# Patient Record
Sex: FEMALE | Race: WHITE
[De-identification: names, ages, dates, MRNs, and addresses within clinical notes are randomized per-mention and may not be internally consistent; named-entity substitution may affect disease eponyms.]

---

## 2020-07-21 ENCOUNTER — HOSPITAL ENCOUNTER (OUTPATIENT)
Dept: HOSPITAL 84 - D.ER | Age: 80
Setting detail: OBSERVATION
LOS: 1 days | Discharge: HOME | End: 2020-07-22
Attending: FAMILY MEDICINE | Admitting: FAMILY MEDICINE
Payer: MEDICARE

## 2020-07-21 VITALS — DIASTOLIC BLOOD PRESSURE: 55 MMHG | SYSTOLIC BLOOD PRESSURE: 94 MMHG

## 2020-07-21 VITALS
HEIGHT: 61 IN | WEIGHT: 190.4 LBS | HEIGHT: 61 IN | BODY MASS INDEX: 35.95 KG/M2 | WEIGHT: 190.4 LBS | BODY MASS INDEX: 35.95 KG/M2

## 2020-07-21 VITALS — SYSTOLIC BLOOD PRESSURE: 105 MMHG | DIASTOLIC BLOOD PRESSURE: 51 MMHG

## 2020-07-21 VITALS — SYSTOLIC BLOOD PRESSURE: 93 MMHG | DIASTOLIC BLOOD PRESSURE: 59 MMHG

## 2020-07-21 VITALS — DIASTOLIC BLOOD PRESSURE: 49 MMHG | SYSTOLIC BLOOD PRESSURE: 91 MMHG

## 2020-07-21 VITALS — DIASTOLIC BLOOD PRESSURE: 50 MMHG | SYSTOLIC BLOOD PRESSURE: 98 MMHG

## 2020-07-21 DIAGNOSIS — I25.10: ICD-10-CM

## 2020-07-21 DIAGNOSIS — E03.9: ICD-10-CM

## 2020-07-21 DIAGNOSIS — F32.9: ICD-10-CM

## 2020-07-21 DIAGNOSIS — J81.1: ICD-10-CM

## 2020-07-21 DIAGNOSIS — I21.4: Primary | ICD-10-CM

## 2020-07-21 DIAGNOSIS — J44.9: ICD-10-CM

## 2020-07-21 DIAGNOSIS — R06.02: ICD-10-CM

## 2020-07-21 LAB
ALBUMIN SERPL-MCNC: 3.8 G/DL (ref 3.4–5)
ALP SERPL-CCNC: 59 U/L (ref 30–120)
ALT SERPL-CCNC: 21 U/L (ref 10–68)
ANION GAP SERPL CALC-SCNC: 12 MMOL/L (ref 8–16)
BASOPHILS NFR BLD AUTO: 0.2 % (ref 0–2)
BILIRUB SERPL-MCNC: 0.28 MG/DL (ref 0.2–1.3)
BUN SERPL-MCNC: 15 MG/DL (ref 7–18)
CALCIUM SERPL-MCNC: 8.4 MG/DL (ref 8.5–10.1)
CHLORIDE SERPL-SCNC: 105 MMOL/L (ref 98–107)
CK MB SERPL-MCNC: 10.1 U/L (ref 0–3.6)
CK SERPL-CCNC: 290 UL (ref 21–215)
CO2 SERPL-SCNC: 28.3 MMOL/L (ref 21–32)
CREAT SERPL-MCNC: 1.2 MG/DL (ref 0.6–1.3)
D DIMER PPP FEU-MCNC: 0.89 UG/MLFEU (ref 0.2–0.54)
EOSINOPHIL NFR BLD: 0.6 % (ref 0–7)
ERYTHROCYTE [DISTWIDTH] IN BLOOD BY AUTOMATED COUNT: 14.2 % (ref 11.5–14.5)
GLOBULIN SER-MCNC: 3.1 G/L
GLUCOSE SERPL-MCNC: 105 MG/DL (ref 74–106)
HCT VFR BLD CALC: 40.2 % (ref 36–48)
HGB BLD-MCNC: 12.6 G/DL (ref 12–16)
IMM GRANULOCYTES NFR BLD: 0.2 % (ref 0–5)
INR PPP: 1.03 (ref 0.85–1.17)
LYMPHOCYTES NFR BLD AUTO: 14.3 % (ref 15–50)
MCH RBC QN AUTO: 28.3 PG (ref 26–34)
MCHC RBC AUTO-ENTMCNC: 31.3 G/DL (ref 31–37)
MCV RBC: 90.1 FL (ref 80–100)
MONOCYTES NFR BLD: 4.1 % (ref 2–11)
NEUTROPHILS NFR BLD AUTO: 80.6 % (ref 40–80)
OSMOLALITY SERPL CALC.SUM OF ELEC: 281 MOSM/KG (ref 275–300)
PLATELET # BLD: 197 10X3/UL (ref 130–400)
PMV BLD AUTO: 11.2 FL (ref 7.4–10.4)
POTASSIUM SERPL-SCNC: 4.3 MMOL/L (ref 3.5–5.1)
PROT SERPL-MCNC: 6.9 G/DL (ref 6.4–8.2)
PROTHROMBIN TIME: 13.4 SECONDS (ref 11.6–15)
RBC # BLD AUTO: 4.46 10X6/UL (ref 4–5.4)
SODIUM SERPL-SCNC: 141 MMOL/L (ref 136–145)
TROPONIN I SERPL-MCNC: 2.83 NG/ML (ref 0–0.06)
WBC # BLD AUTO: 10.3 10X3/UL (ref 4.8–10.8)

## 2020-07-22 VITALS — SYSTOLIC BLOOD PRESSURE: 92 MMHG | DIASTOLIC BLOOD PRESSURE: 56 MMHG

## 2020-07-22 VITALS — DIASTOLIC BLOOD PRESSURE: 43 MMHG | SYSTOLIC BLOOD PRESSURE: 84 MMHG

## 2020-07-22 VITALS — DIASTOLIC BLOOD PRESSURE: 39 MMHG | SYSTOLIC BLOOD PRESSURE: 83 MMHG

## 2020-07-22 LAB
ALBUMIN SERPL-MCNC: 3.3 G/DL (ref 3.4–5)
ALP SERPL-CCNC: 50 U/L (ref 30–120)
ALT SERPL-CCNC: 19 U/L (ref 10–68)
ANION GAP SERPL CALC-SCNC: 12 MMOL/L (ref 8–16)
BASOPHILS NFR BLD AUTO: 0.2 % (ref 0–2)
BILIRUB SERPL-MCNC: 0.35 MG/DL (ref 0.2–1.3)
BUN SERPL-MCNC: 14 MG/DL (ref 7–18)
CALCIUM SERPL-MCNC: 8 MG/DL (ref 8.5–10.1)
CHLORIDE SERPL-SCNC: 106 MMOL/L (ref 98–107)
CK MB SERPL-MCNC: 9 U/L (ref 0–3.6)
CK SERPL-CCNC: 352 UL (ref 21–215)
CO2 SERPL-SCNC: 28.6 MMOL/L (ref 21–32)
CREAT SERPL-MCNC: 0.9 MG/DL (ref 0.6–1.3)
EOSINOPHIL NFR BLD: 2 % (ref 0–7)
ERYTHROCYTE [DISTWIDTH] IN BLOOD BY AUTOMATED COUNT: 14.5 % (ref 11.5–14.5)
GLOBULIN SER-MCNC: 2.7 G/L
GLUCOSE SERPL-MCNC: 91 MG/DL (ref 74–106)
HCT VFR BLD CALC: 36.9 % (ref 36–48)
HGB BLD-MCNC: 11.4 G/DL (ref 12–16)
IMM GRANULOCYTES NFR BLD: 0 % (ref 0–5)
LYMPHOCYTES NFR BLD AUTO: 21.8 % (ref 15–50)
MCH RBC QN AUTO: 28.1 PG (ref 26–34)
MCHC RBC AUTO-ENTMCNC: 30.9 G/DL (ref 31–37)
MCV RBC: 90.9 FL (ref 80–100)
MONOCYTES NFR BLD: 7.1 % (ref 2–11)
NEUTROPHILS NFR BLD AUTO: 68.9 % (ref 40–80)
OSMOLALITY SERPL CALC.SUM OF ELEC: 283 MOSM/KG (ref 275–300)
PLATELET # BLD: 176 10X3/UL (ref 130–400)
PMV BLD AUTO: 11.1 FL (ref 7.4–10.4)
POTASSIUM SERPL-SCNC: 4.6 MMOL/L (ref 3.5–5.1)
PROT SERPL-MCNC: 6 G/DL (ref 6.4–8.2)
RBC # BLD AUTO: 4.06 10X6/UL (ref 4–5.4)
SODIUM SERPL-SCNC: 142 MMOL/L (ref 136–145)
TROPONIN I SERPL-MCNC: 2.06 NG/ML (ref 0–0.06)
WBC # BLD AUTO: 5.5 10X3/UL (ref 4.8–10.8)

## 2020-07-22 NOTE — NUR
LEFT FA 22G IV DC'D WITH CATH INTACT. DISCHARGE INSTRUCTIONS AND TEACHING DONE
WITH PT AND PT'S DAUGHTER WHO IS AT BEDSIDE. ALL QUESTIONS ANSWERED AND THEY
HAVE NO FURTHER QUESTIONS. PT STATES SHE WANTS NANCYER TO SIGN HER CHART COPY
PAPERWORK. DAUGHTER SIGNED DISCAHRGE CHART COPY. PT'S DAUGHTER STATES THEIR
RIDE IS ON THE WAY TO PICK THEM UP. I VERBALIZED UNDERSTANDING.

## 2020-07-22 NOTE — NUR
PT'S DAUGHTER IN [facility] DEMANDING FOR RIGHT AC IV TO BE TAKEN OUT OF PT.
BRYSON;FELIX RN DC'D RIGHT AC IV AND INSERTED A LEFT FA 22G IV.

## 2020-07-27 ENCOUNTER — HOSPITAL ENCOUNTER (INPATIENT)
Dept: HOSPITAL 84 - D.ER | Age: 80
LOS: 3 days | Discharge: HOME | DRG: 280 | End: 2020-07-30
Attending: FAMILY MEDICINE | Admitting: FAMILY MEDICINE
Payer: MEDICARE

## 2020-07-27 VITALS — DIASTOLIC BLOOD PRESSURE: 42 MMHG | SYSTOLIC BLOOD PRESSURE: 113 MMHG

## 2020-07-27 VITALS — DIASTOLIC BLOOD PRESSURE: 61 MMHG | SYSTOLIC BLOOD PRESSURE: 111 MMHG

## 2020-07-27 VITALS — DIASTOLIC BLOOD PRESSURE: 54 MMHG | SYSTOLIC BLOOD PRESSURE: 118 MMHG

## 2020-07-27 VITALS — BODY MASS INDEX: 37 KG/M2 | BODY MASS INDEX: 37 KG/M2 | BODY MASS INDEX: 37 KG/M2 | WEIGHT: 196 LBS | HEIGHT: 61 IN

## 2020-07-27 VITALS — SYSTOLIC BLOOD PRESSURE: 108 MMHG | DIASTOLIC BLOOD PRESSURE: 52 MMHG

## 2020-07-27 VITALS — SYSTOLIC BLOOD PRESSURE: 118 MMHG | DIASTOLIC BLOOD PRESSURE: 65 MMHG

## 2020-07-27 VITALS — DIASTOLIC BLOOD PRESSURE: 53 MMHG | SYSTOLIC BLOOD PRESSURE: 113 MMHG

## 2020-07-27 VITALS — SYSTOLIC BLOOD PRESSURE: 114 MMHG | DIASTOLIC BLOOD PRESSURE: 53 MMHG

## 2020-07-27 VITALS — SYSTOLIC BLOOD PRESSURE: 120 MMHG | DIASTOLIC BLOOD PRESSURE: 72 MMHG

## 2020-07-27 VITALS — DIASTOLIC BLOOD PRESSURE: 70 MMHG | SYSTOLIC BLOOD PRESSURE: 121 MMHG

## 2020-07-27 VITALS — SYSTOLIC BLOOD PRESSURE: 86 MMHG | DIASTOLIC BLOOD PRESSURE: 40 MMHG

## 2020-07-27 VITALS — DIASTOLIC BLOOD PRESSURE: 53 MMHG | SYSTOLIC BLOOD PRESSURE: 121 MMHG

## 2020-07-27 VITALS — DIASTOLIC BLOOD PRESSURE: 50 MMHG | SYSTOLIC BLOOD PRESSURE: 117 MMHG

## 2020-07-27 VITALS — DIASTOLIC BLOOD PRESSURE: 66 MMHG | SYSTOLIC BLOOD PRESSURE: 120 MMHG

## 2020-07-27 VITALS — SYSTOLIC BLOOD PRESSURE: 115 MMHG | DIASTOLIC BLOOD PRESSURE: 43 MMHG

## 2020-07-27 VITALS — SYSTOLIC BLOOD PRESSURE: 81 MMHG | DIASTOLIC BLOOD PRESSURE: 35 MMHG

## 2020-07-27 VITALS — DIASTOLIC BLOOD PRESSURE: 50 MMHG | SYSTOLIC BLOOD PRESSURE: 113 MMHG

## 2020-07-27 VITALS — DIASTOLIC BLOOD PRESSURE: 45 MMHG | SYSTOLIC BLOOD PRESSURE: 76 MMHG

## 2020-07-27 VITALS — DIASTOLIC BLOOD PRESSURE: 52 MMHG | SYSTOLIC BLOOD PRESSURE: 109 MMHG

## 2020-07-27 VITALS — DIASTOLIC BLOOD PRESSURE: 66 MMHG | SYSTOLIC BLOOD PRESSURE: 112 MMHG

## 2020-07-27 VITALS — SYSTOLIC BLOOD PRESSURE: 122 MMHG | DIASTOLIC BLOOD PRESSURE: 62 MMHG

## 2020-07-27 VITALS — SYSTOLIC BLOOD PRESSURE: 113 MMHG | DIASTOLIC BLOOD PRESSURE: 56 MMHG

## 2020-07-27 VITALS — SYSTOLIC BLOOD PRESSURE: 118 MMHG | DIASTOLIC BLOOD PRESSURE: 61 MMHG

## 2020-07-27 VITALS — SYSTOLIC BLOOD PRESSURE: 114 MMHG | DIASTOLIC BLOOD PRESSURE: 58 MMHG

## 2020-07-27 VITALS — DIASTOLIC BLOOD PRESSURE: 59 MMHG | SYSTOLIC BLOOD PRESSURE: 112 MMHG

## 2020-07-27 VITALS — SYSTOLIC BLOOD PRESSURE: 112 MMHG | DIASTOLIC BLOOD PRESSURE: 62 MMHG

## 2020-07-27 VITALS — SYSTOLIC BLOOD PRESSURE: 117 MMHG | DIASTOLIC BLOOD PRESSURE: 74 MMHG

## 2020-07-27 VITALS — SYSTOLIC BLOOD PRESSURE: 106 MMHG | DIASTOLIC BLOOD PRESSURE: 49 MMHG

## 2020-07-27 VITALS — DIASTOLIC BLOOD PRESSURE: 38 MMHG | SYSTOLIC BLOOD PRESSURE: 83 MMHG

## 2020-07-27 VITALS — DIASTOLIC BLOOD PRESSURE: 62 MMHG | SYSTOLIC BLOOD PRESSURE: 122 MMHG

## 2020-07-27 VITALS — SYSTOLIC BLOOD PRESSURE: 116 MMHG | DIASTOLIC BLOOD PRESSURE: 53 MMHG

## 2020-07-27 VITALS — SYSTOLIC BLOOD PRESSURE: 128 MMHG | DIASTOLIC BLOOD PRESSURE: 73 MMHG

## 2020-07-27 VITALS — SYSTOLIC BLOOD PRESSURE: 119 MMHG | DIASTOLIC BLOOD PRESSURE: 65 MMHG

## 2020-07-27 DIAGNOSIS — I42.8: ICD-10-CM

## 2020-07-27 DIAGNOSIS — I10: ICD-10-CM

## 2020-07-27 DIAGNOSIS — J96.01: ICD-10-CM

## 2020-07-27 DIAGNOSIS — I21.A1: ICD-10-CM

## 2020-07-27 DIAGNOSIS — G20: ICD-10-CM

## 2020-07-27 DIAGNOSIS — I50.9: ICD-10-CM

## 2020-07-27 DIAGNOSIS — E03.9: ICD-10-CM

## 2020-07-27 DIAGNOSIS — E78.5: ICD-10-CM

## 2020-07-27 DIAGNOSIS — I27.20: ICD-10-CM

## 2020-07-27 DIAGNOSIS — I11.0: Primary | ICD-10-CM

## 2020-07-27 DIAGNOSIS — I95.9: ICD-10-CM

## 2020-07-27 LAB
ALBUMIN SERPL-MCNC: 3.7 G/DL (ref 3.4–5)
ALP SERPL-CCNC: 56 U/L (ref 30–120)
ALT SERPL-CCNC: 38 U/L (ref 10–68)
ANION GAP SERPL CALC-SCNC: 9.3 MMOL/L (ref 8–16)
APTT BLD: 26.5 SECONDS (ref 22.8–39.4)
BASOPHILS NFR BLD AUTO: 0 % (ref 0–2)
BILIRUB SERPL-MCNC: 0.5 MG/DL (ref 0.2–1.3)
BUN SERPL-MCNC: 25 MG/DL (ref 7–18)
CALCIUM SERPL-MCNC: 8 MG/DL (ref 8.5–10.1)
CHLORIDE SERPL-SCNC: 106 MMOL/L (ref 98–107)
CK MB SERPL-MCNC: 2.9 U/L (ref 0–3.6)
CK MB SERPL-MCNC: 4 U/L (ref 0–3.6)
CK SERPL-CCNC: 164 UL (ref 21–215)
CK SERPL-CCNC: 190 UL (ref 21–215)
CO2 SERPL-SCNC: 30.6 MMOL/L (ref 21–32)
CREAT SERPL-MCNC: 1.2 MG/DL (ref 0.6–1.3)
EOSINOPHIL NFR BLD: 0.2 % (ref 0–7)
ERYTHROCYTE [DISTWIDTH] IN BLOOD BY AUTOMATED COUNT: 14.7 % (ref 11.5–14.5)
GLOBULIN SER-MCNC: 3.3 G/L
GLUCOSE SERPL-MCNC: 130 MG/DL (ref 74–106)
HCT VFR BLD CALC: 36.8 % (ref 36–48)
HGB BLD-MCNC: 11.7 G/DL (ref 12–16)
IMM GRANULOCYTES NFR BLD: 0.2 % (ref 0–5)
INR PPP: 1.01 (ref 0.85–1.17)
LYMPHOCYTES NFR BLD AUTO: 14.8 % (ref 15–50)
MAGNESIUM SERPL-MCNC: 2.3 MG/DL (ref 1.8–2.4)
MCH RBC QN AUTO: 28.3 PG (ref 26–34)
MCHC RBC AUTO-ENTMCNC: 31.8 G/DL (ref 31–37)
MCV RBC: 89.1 FL (ref 80–100)
MONOCYTES NFR BLD: 2.4 % (ref 2–11)
NEUTROPHILS NFR BLD AUTO: 82.4 % (ref 40–80)
OSMOLALITY SERPL CALC.SUM OF ELEC: 288 MOSM/KG (ref 275–300)
PLATELET # BLD: 184 10X3/UL (ref 130–400)
PMV BLD AUTO: 11.1 FL (ref 7.4–10.4)
POTASSIUM SERPL-SCNC: 3.9 MMOL/L (ref 3.5–5.1)
PROT SERPL-MCNC: 7 G/DL (ref 6.4–8.2)
PROTHROMBIN TIME: 13.3 SECONDS (ref 11.6–15)
RBC # BLD AUTO: 4.13 10X6/UL (ref 4–5.4)
SODIUM SERPL-SCNC: 142 MMOL/L (ref 136–145)
TROPONIN I SERPL-MCNC: 0.07 NG/ML (ref 0–0.06)
TROPONIN I SERPL-MCNC: 0.07 NG/ML (ref 0–0.06)
WBC # BLD AUTO: 5.9 10X3/UL (ref 4.8–10.8)

## 2020-07-27 NOTE — NUR
PT LAYING IN BED. NO DISTRESS NOTED. COLOR WNL FOR RACE. LEVOPHED INITIATED AT
THIS TIME. DENIES CHEST PAIN.

## 2020-07-27 NOTE — NUR
REASSESSMENT COMPLETED, SEE FLOWSHEET. PT C/O PAIN IN RIGHT UPPER ABDOMEN AT
THIS TIME. REPOSITIONED FOR COMFORT. OSMANI DAVILA PAGED, UPDATED ON PT
STATUS, NEW ORDERS RECEIVED.

## 2020-07-27 NOTE — HEMODYNAMI
PATIENT:CARI BLAKE                                       MEDICAL RECORD: [id number]
: 42                                            [facility]
ACCT# [id number]                                       ADMISSION DATE: 20
 
 
 Generatedon:202015:01
Patient name: CARI BLAKE Patient #: [id number] Visit #: [id number] SSN: [id number]
176 :
1940 Date of study: 2020
Page: Of
Hemodynamic Procedure Report
****************************
Patient Data
Patient Demographics
Procedure consent was obtained
First Name: CARI            Gender: Female
Last Name: LOU            : 1940
Patient #: [id number]       Age: 80 year(s)
Visit #: [id number]       Race: 
SSN: [id number]
Accession #:
[id number]
Additional ID: [id number]
Contact details
Address: [address] [place] [address]    Phone: [phone]
State: [place]
City: [place]
Zip code: [address]
Past Medical History
Allergies
Allergen        Reaction        Date         Comments
Reported
Other allergy                   2020    NK
Admission
Admission Data
Admission Date: 2020   Admission Time: 13:23
Arrival Date: 2020     Arrival Time: 0:00
Room #: BRIAN
Height (in.): 61.02         BSA: 1.86 (m2)
Height (cm.): 155           BMI: 36.21 (kg/m2)
Weight (lbs.): 191.8
Weight (kg.): 87
Lab Results
Lab Result Date: 2020  Lab Result Time: 0:00
Biochemistry
Name          Units    Result                Min      Max
BUN           mg/dl    25       --(----)-*   7        18
CK-MB         ng/ml    4        --(----)*-   0        3.6
Creatinine    mg/dl    1.2      --(---*)--   0.6      1.3
Creatinine    l       190      --(---*)--   21       215
Kinase
eGFR          ml/min   46       *-(----)--   90       120
NONAFRICAN
Troponin l    ng/ml    0.072    --(----)*-   0        0.06
CBC
Name         Units    Result                Min      Max
Hematocrit   %        36.8     *-(----)--   42       54
Hemoglobin   g/dl     11.7     *-(----)--   13.5     17.5
Procedure
 
Procedure Types
Cath Procedure
Diagnostic Procedure
[facility] w/Coronaries
Procedure Description
Procedure Date
Procedure Date: 2020
Procedure Start Time: 14:49
Procedure End Time: 14:58
Procedure Staff
Name                            Function
Danie Mccormack MD              Performing Physician
Shaina Ayala RT               Monitor
Tracey Blackwood RT              Scrub
Reyna Delgado RN                Nurse
Indication
Chest pain
Procedure Data
Cath Procedure
Fluoroscopy
Diagnostic fluoroscopy      Total fluoroscopy Time: 1.1
time: 1.1 min               min
Diagnostic fluoroscopy      Total fluoroscopy dose: 517
dose: 517 mGy               mGy
Contrast Material
Contrast Material Type                       Amount (ml)
Isovue 300                                   45
Entry Location
Entry     Primary  Successful  Side  Size  Upsize Upsize Entry    Closure Succes
sful  Closure
Location                             (Fr)  1 (Fr) 2 (Fr) Remarks  Device        
      Remarks
Femoral                        Right 5 Fr                         Exoseal
artery
Estimated blood loss: 10 ml
Diagnostic catheters
Device Type               Used For           End Catheter
Placement
MULTIPACK JL 4.0 5Fr      Procedure
catheter
MULTIPACK 3DRC 5Fr        Procedure
catheter
MULTIPACK Pigtail 5 Fr    Ventriculography
catheter
Procedure Complications
No complications
Procedure Medications
Medication           Administration Route Dosage
0.9% NaCl            I.V.                 100 ml/hr
Oxygen                                    6 l/min
Lidocaine 2%         added to field       20
Heparin Flush Bag    added to field       2 bags
(1000units/500ml NS)
Versed               I.V.                 1 mg
Hemodynamics
Rest
BSA: 1.86 (m2) HGB: 11.7 (g/dl) O2 Consumption: Estimated: 167.15 (ml/min) O2 Co
nsumption
indexed: Estimated:89.87 (ml/min/m) Heart Rate: 69 (bpm)
 
Pressure Samples
Time     Site     Value (mmHg) Purpose      Heart      Use
Rate(bpm)
14:53    LV       109/25,32    Snapshot     77
Gradients
Valve  Time  Site Site Mean    SEP/DFP    Peak To Heart  Use
1    2    (mmHg)  (sec/min)  Peak    Rate
(mmHg)  (bpm)
Aortic 14:53 LV   AO                              74
Snapshots
Pre Cath      Intra         NCS           Post Cath
Vital Signs
Time     Heart  Resp   SPO2 etCO2   NIBP       Rhythm  Pain    Sedation
Rate   (ipm)  (%)  (mmHg)  (mmHg)             Status  Level
(bpm)
14:17:25 69     16     100  0       107/59(81) NSR     0 (11)  10(A)
, No
pain
14:21:42 66     14     96   0       117/52(85) NSR     0 (11)  10(A)
, No
pain
14:25:55 66     10     99   0       107/57(87) NSR     0 (11)  10(A)
, No
pain
14:30:05 65     15     100  0       117/64(86) NSR     0 (11)  10(A)
, No
pain
14:34:13 68     19     99   0       118/63(96) NSR     0 (11)  10(A)
, No
pain
14:39:12 66     12     99   0       Measuring  NSR     0 (11)  10(A)
, No
pain
14:39:31 67     12     99   0       113/63(82) NSR     0 (11)  10(A)
, No
pain
14:43:39 62     12     100  0       113/59(77) NSR     0 (11)  10(A)
, No
pain
14:48:38 67     16     99   0       Measuring  NSR     0 (11)  10(A)
, No
pain
14:48:42 67     16     98   0       109/58(82) NSR     0 (11)  10(A)
, No
pain
14:52:54 65     16     98   0       121/62(89) NSR     0 (11)  10(A)
, No
pain
14:57:08 64     13     100  0       109/61(80) NSR     0 (11)  10(A)
, No
pain
Medications
Time     Medication       Route  Dose  Verified Delivered Reason     Notes  Effe
ctiveness
by       by
14:16:11 Oxygen           HFNC   6     Danie  Reyna     for low 02
l/min [facility] Delgado   satcapo JOHNSON       RN
14:16:11 0.9% NaCl        I.V.   100   Danie  Reyna     used for
ml/hr KseniaChong Delgado   procedure
 
MD       RN
14:16:31 Lidocaine 2%     added  20ml  Danie Helton   for local
to     vial  [facility] John   anesthetic
field        MD       MD
14:16:38 Heparin Flush    added  2     Danie Helton   used for
Bag              to     bags  [facility] Ch[facility] Schwarz   procedure
(1000units/500ml field        MD       MD
NS)
14:48:18 Versed           I.V.   1 mg  Danie  Reyna     for
KseniaChong Delgado   sedation
MD       RN
Procedure Log
Time     Note
13:43:24 Informed consent obtained and on chart
13:44:38 **ACC** Patient presents with Non-STEMI CCS Anginal
Class 3--Marked limitation of physical activity,
angina occurs with ordinary activity..
14:00:28 Indication : Chest pain
14:02:02 Lab Result : Creatinine 1.2 mg/dl
14:02:02 Lab Result : BUN 25 mg/dl
14:02:02 Lab Result : Hemoglobin 11.7 g/dl
14:02:02 Lab Result : Hematocrit 36.8 %
14:02:02 Lab Result : eGFR NONAFRICAN 46 ml/min
14:02:02 Lab Result : CK-MB 4 ng/ml
14:02: Lab Result : Creatinine Kinase 190 l
14:02: Lab Result : Troponin l 0.072 ng/ml
14:02:10 Arrival Date: 2020 12:00:00 AM
14:02:15 Patient Height : 61.02 inches
14:02:19 Patient Weight : 191.8 lbs
14:02:35 Procedure Status Urgent Heart Cath (IP).
14:02:39 Reyna Delgado RN sent for patient. Start room use.
14:02:42 Time tracking: Regular hours (M-F 7:00 - 5:00)
14:02:49 Plan of Care:Hemodynamics will remain stable., Cardiac
rhythm will remain stable., Comfort level will be
maintained., Respiratory function will remain
adequate., Patient/ family verbilizes understanding of
procedure., Procedure tolerated without complication.,
Recovers from procedure without complications..
14:03:14 Patient allergic to Other allergyNKDA
14:07:40 Patient received from CVICU to CCL 1 Alert and
oriented. Tansferred to table in Supine position.
14:07:43 Warm blankets applied, and myrna hugger turned on for
patient comfort.
14:07:44 Correct patient and procedure confirmed by team.
14:07:44 ECG and BP/O2 sat monitors applied to patient.
14:15:59 Vital chart was started
14:16:11 Oxygen 6 l/min HFNC was administered by Reyna Delgado RN; for low 02 sats; Verbal order read back and
verified.
14:16:11 0.9% NaCl 100 ml/hr I.V. was administered by Reyna Delgado RN; used for procedure; Verbal order read back
and verified.
14:16:31 Lidocaine 2% 20ml vial added to field was administered
by Danie Mccormack MD; for local anesthetic; Verbal
order read back and verified.
14:16:38 Heparin Flush Bag (1000units/500ml NS) 2 bags added to
field was administered by Danie Mccormack MD; used for
procedure; Verbal order read back and verified.
14:18:07 Baseline sample Acquired.
14:18:13 Rhythm: sinus rhythm
 
14:18:16 Full Disclosure recording started
14:18:38 H&P Date Dictated: 2020 Within 30 days and on
chart., H&P Addendum completed by physician on day of
procedure. (MUST COMPLETE FOR ALL OUTPATIENTS).
14:18:40 Pre-procedure instructions explained to patient.
14:18:43 Family unavailable.
14:18:45 Patient NPO since Midnight.
14:18:50 Is the patient allergic to Iodine/contrast media? No.
14:18:58 Is patient on blood thinner?No
14:19:01 Patient diabetic? No.
14:19:07 Snore? Unknown
14:19:09 Sleep apnea? No
14:19:14 Airway obstruction? Yes copd
14:21:14 Dentures? No ?
14:21:21 Patient pain scale 0/10 ?.
14:21:33 IV patent on arrival in right forearm with 0.9% NaCl
at [facility].
14:21:38 Lab results completed and on chart.
14:21:46 Right groin area was prepped with chlora-prep and
draped in sterile fashion
14:21:50 Alarms reviewed by R. N.
14:21:52 Sharps counted by scrub and verified by R.N.
14:21:53 Physician paged
14:23:03 Use device set Femoral Dx
14:23:05 ACIST Syringe (93022) opened to sterile field.
14:23:06 Bag Decanter (2002S) opened to sterile field.
14:23:06 Medline Cath Pack ([id number]) opened to sterile field.
14:23:07 ACIST Hand Control (22896) opened to sterile field.
14:23:07 ACIST Manifold (01122) opened to sterile field.
14:23:08 DIAGNOSTIC Multipack 5Fr catheter set ([id number]) opened
to sterile field.
14:23:11 Tegaderm 4 x 4 (1626W) opened to sterile field.
14:23:12 SHEATH 5FR [place] (AMO447) opened to sterile field.
14:23:13 EMERALD Guide Wire (472-381) opened to sterile field.
14:25:06 Pt arrived to CL on HFNC @ 6L. NIRANJAN EtCO2 d/t HFNC.
14:47:49 Physician arrived
14:47:49 --------ALL STOP TIME OUT------
14:47:50 Final Timeout: patient, procedure, and site verified
with staff and physician. All members of the team are
in agreement.
14:47:52 Right groin site verified by team.
14:47:58 Fire Safety Assessment: A--An alcohol-based skin
anteseptic being used preoperatively., C--Open oxygen
or nitrous oxide is being used., D--An ESU, laser, or
fiber-optic light is being used.
14:48:03 Physical assessment completed. ASA score P 3 - A
patient with severe systemic disease as per Danie Mccormack MD.
14:48:09 2) 60-89 Mildly reduced kidney function, and other
findings (as for stage 1) point to kidney disease.
14:48:16 Maximum allowable contrast dose (3.7 X eGFR X 0.75)127
ml.
14:48:18 Versed 1 mg I.V. was administered by Reyna Delgado RN;
for sedation; Verbal order read back and verified.
14:48:20 Sedation plan: IV Moderate Sedation Medication:Versed,
Fentanyl
14:48:48 Procedure started.
14:49:01 Local anesthetic to right femoral artery with
Lidocaine 2% by Danie Mccormack MD.**INITIAL ACCESS
ONLY**
 
14:49:17 A 5 Fr sheath was inserted into the Right Femoral
artery
14:49:22 j wire advanced.
14:49:53 A MULTIPACK JL 4.0 5Fr catheter was advanced over the
wire and used for Procedure.
14:49:58 LCA angiography performed.
14:50:22 Catheter removed.
14:51:03 A MULTIPACK 3DRC 5Fr catheter was advanced over the
wire and used for Procedure.
14:51:07 RCA angiography performed.
14:52:00 Catheter removed.
14:52:07 A MULTIPACK Pigtail 5 Fr catheter was advanced over
the wire and used for Ventriculography.
14:52:10 LV gram done using LINK
14:53:57 EF : 25 %
14:53:59 Catheter removed.
14:54:02 EXOSEAL 5Fr () opened to sterile field.
14:54:17 Sheath removed intact; hemostasis achieved with
Exoseal to the Right Femoral artery.
14:54:39 Procedure ended.(Physican Out)
14:55:40 Fluoroscopy time 01.10 minutes.
14:55:52 Fluoroscopy dose: 517 mGy
14:55:52 Flurop Dose total: 517
14:55:58 Dose Area Product 96180 mGy/cm.
14:56:05 Contrast amount:Isovue 300 45ml.
14:56:09 Maximum allowable dose exceeded? No.
14:56:12 Insertion/operative site no bleeding no hematoma.
14:56:17 Post-op/insertion site Right Femoral artery dressed
using a 4 x 4 and Tegaderm.
14:56:20 Post Procedure Pulses reassessed and unchanged
14:56:25 Post-procedure physical assessment completed. ASA
score P 3 - A patient with severe systemic disease as
per Danie Mccormack MD.
14:56:28 Post procedure rhythm: sinus rhythm
14:56:32 Estimated blood loss: 10 ml
14:56:35 Post procedure instruction explained to
patient.Patient verbalizes understanding.
14:57:13 Procedure and supply charges have been captured,
reviewed, submitted and are correct.
14:57:33 Procedure Complication : No complications
14:57:38 Vital chart was stopped
14:58:10 [facility] Findings: MVD- manage w/ optimal medication
therapy
14:58:18 Report given to Pre/Post Procedure Room.
14:58:24 Patient transfered to Pre/Post Procedure Room with
Bed.
14:58:27 Procedure ended.
14:58:27 Full Disclosure recording stopped
14:58:33 End room use (Document Last)
14:58:34 End room use (Document Last)
14:59:54 End room use (Document Last)
15:00:21 End room use (Document Last)
Device Usage
Item Name   Manufacture  Quantity  Catalog    Hospital Part    Current Minimal L
ot# /
Number     Charge   Number  Stock   Stock   Serial#
Code
ACIST       Acist        1         93157      485296   867908  311231  20
Syringe     Medical
(61869)     Systems Inc
 
Bag         Microtek     1               070453   62958   888999  5
Decanter    Medical Inc.
()
Medline     Medline      1         [id number]  289828   34567   641023  5
Cath Pack
([id number])
ACIST Hand  Acist        1         81041      162098   650296  491979  5
Control     Medical
(81676)     Systems Inc
ACIST       Acist        1         11650      372228   344460  692132  5
Manifold    Medical
(59493)     Systems Inc
DIAGNOSTIC  Cardinal     1         JV6231     375743   95314   783524  30
Multipack   Health
5Fr
catheter
set
([id number])
Tegaderm 4  3M           1         1626W      680896   209617  213817  5
x 4 (1626W)
SHEATH 5FR  Terumo       1         JHY646     654584   814388  701095  5
[place]
(FBY846)
EMERALD     Cardinal     1         502-455    050128   198489  734878  5
Guide Wire  Health
(502455)
MULTIPACK   Cardinal     1                                     513057  5
JL 4.0 5Fr  Health
catheter
MULTIPACK   Cardinal     1                                     868350  5
3DRC 5Fr    Health
catheter
MULTIPACK   Cardinal     1                                     122082  5
Pigtail 5   Health
Fr catheter
EXOSEAL 5Fr Cardinal     1               642993   294289  087244  10
()     Health
Signature Audit [place]
Stage           Time        Signature      Unsigned
Intra-Procedure 2020   Reyna Delgado
2:59:54 PM  RN
Intra-Procedure 2020   Shaina Ayala
3:00:21 PM  RT(R)
Intra-Procedure 2020   Danie Charles
3:01:42 PM  Chong JOHNSON
 
 
 
 
 
 
 
 
 
 
[id number] [place] [address]

## 2020-07-27 NOTE — NUR
CARDIAC DIET GIVEN. CONSUMED 100%. DENIES PAIN OR SOB. NOREPINEPHRINE
CONTINUES AT 5MCG/MIN. BP STABLE. CARDIAC MONITOR SHOWS SINUS RHYTHM WITH
EPISODES OF FREQUENT PVCS.

## 2020-07-27 NOTE — NUR
RECEIVED BY ANIL FROM ED WITH ED STAFF IN ATTENDANCE. AWAKE AND ALERT.
DENIES PAIN OR SOB. NOREPINEPHRINE INFUSING VIA PUMP AT 5MCG/MIN. NS INITIATED
 ML/HR TO RIGHT AC PIV SITE. PATIENT IS WEARING SUNGLASSES. STATES SHE
IS SENSITIVE TO LIGHT DUE TO PREVIOUS CATARAC SURGERY. C/O HUNGER. ROUTINE
ADMISSION PROCEDURES DONE.

## 2020-07-27 NOTE — NUR
REPORT RECEIVED. PT AAOX4, WITH SLOW TO RESPOND SPEECH. ASSESSMENT COMPLETED,
SEE FLOWSHEET. PIV IN RT AC INFUSING, SEE IV FLOWSHEET. PT HAS SUNGLASSES ON
PER LIGHT SENSITIVITY RELATED TO PREVIOUS CATARACT SURGERY AS STATED BY
PATIENT. WILL CONTINUE TO MONITOR.

## 2020-07-28 VITALS — SYSTOLIC BLOOD PRESSURE: 87 MMHG | DIASTOLIC BLOOD PRESSURE: 43 MMHG

## 2020-07-28 VITALS — DIASTOLIC BLOOD PRESSURE: 70 MMHG | SYSTOLIC BLOOD PRESSURE: 109 MMHG

## 2020-07-28 VITALS — SYSTOLIC BLOOD PRESSURE: 98 MMHG | DIASTOLIC BLOOD PRESSURE: 39 MMHG

## 2020-07-28 VITALS — DIASTOLIC BLOOD PRESSURE: 44 MMHG | SYSTOLIC BLOOD PRESSURE: 104 MMHG

## 2020-07-28 VITALS — DIASTOLIC BLOOD PRESSURE: 53 MMHG | SYSTOLIC BLOOD PRESSURE: 115 MMHG

## 2020-07-28 VITALS — SYSTOLIC BLOOD PRESSURE: 116 MMHG | DIASTOLIC BLOOD PRESSURE: 74 MMHG

## 2020-07-28 VITALS — DIASTOLIC BLOOD PRESSURE: 85 MMHG | SYSTOLIC BLOOD PRESSURE: 124 MMHG

## 2020-07-28 VITALS — DIASTOLIC BLOOD PRESSURE: 23 MMHG | SYSTOLIC BLOOD PRESSURE: 50 MMHG

## 2020-07-28 VITALS — SYSTOLIC BLOOD PRESSURE: 138 MMHG | DIASTOLIC BLOOD PRESSURE: 64 MMHG

## 2020-07-28 VITALS — DIASTOLIC BLOOD PRESSURE: 67 MMHG | SYSTOLIC BLOOD PRESSURE: 121 MMHG

## 2020-07-28 VITALS — SYSTOLIC BLOOD PRESSURE: 107 MMHG | DIASTOLIC BLOOD PRESSURE: 52 MMHG

## 2020-07-28 VITALS — DIASTOLIC BLOOD PRESSURE: 43 MMHG | SYSTOLIC BLOOD PRESSURE: 126 MMHG

## 2020-07-28 VITALS — SYSTOLIC BLOOD PRESSURE: 101 MMHG | DIASTOLIC BLOOD PRESSURE: 49 MMHG

## 2020-07-28 VITALS — SYSTOLIC BLOOD PRESSURE: 85 MMHG | DIASTOLIC BLOOD PRESSURE: 41 MMHG

## 2020-07-28 VITALS — DIASTOLIC BLOOD PRESSURE: 46 MMHG | SYSTOLIC BLOOD PRESSURE: 100 MMHG

## 2020-07-28 VITALS — DIASTOLIC BLOOD PRESSURE: 51 MMHG | SYSTOLIC BLOOD PRESSURE: 106 MMHG

## 2020-07-28 VITALS — DIASTOLIC BLOOD PRESSURE: 54 MMHG | SYSTOLIC BLOOD PRESSURE: 109 MMHG

## 2020-07-28 VITALS — SYSTOLIC BLOOD PRESSURE: 119 MMHG | DIASTOLIC BLOOD PRESSURE: 62 MMHG

## 2020-07-28 VITALS — DIASTOLIC BLOOD PRESSURE: 46 MMHG | SYSTOLIC BLOOD PRESSURE: 106 MMHG

## 2020-07-28 VITALS — DIASTOLIC BLOOD PRESSURE: 57 MMHG | SYSTOLIC BLOOD PRESSURE: 112 MMHG

## 2020-07-28 VITALS — SYSTOLIC BLOOD PRESSURE: 132 MMHG | DIASTOLIC BLOOD PRESSURE: 72 MMHG

## 2020-07-28 VITALS — DIASTOLIC BLOOD PRESSURE: 79 MMHG | SYSTOLIC BLOOD PRESSURE: 160 MMHG

## 2020-07-28 VITALS — SYSTOLIC BLOOD PRESSURE: 120 MMHG | DIASTOLIC BLOOD PRESSURE: 66 MMHG

## 2020-07-28 VITALS — DIASTOLIC BLOOD PRESSURE: 56 MMHG | SYSTOLIC BLOOD PRESSURE: 129 MMHG

## 2020-07-28 VITALS — DIASTOLIC BLOOD PRESSURE: 48 MMHG | SYSTOLIC BLOOD PRESSURE: 100 MMHG

## 2020-07-28 VITALS — DIASTOLIC BLOOD PRESSURE: 38 MMHG | SYSTOLIC BLOOD PRESSURE: 83 MMHG

## 2020-07-28 VITALS — SYSTOLIC BLOOD PRESSURE: 115 MMHG | DIASTOLIC BLOOD PRESSURE: 42 MMHG

## 2020-07-28 VITALS — DIASTOLIC BLOOD PRESSURE: 49 MMHG | SYSTOLIC BLOOD PRESSURE: 136 MMHG

## 2020-07-28 VITALS — SYSTOLIC BLOOD PRESSURE: 89 MMHG | DIASTOLIC BLOOD PRESSURE: 49 MMHG

## 2020-07-28 VITALS — SYSTOLIC BLOOD PRESSURE: 117 MMHG | DIASTOLIC BLOOD PRESSURE: 69 MMHG

## 2020-07-28 VITALS — DIASTOLIC BLOOD PRESSURE: 74 MMHG | SYSTOLIC BLOOD PRESSURE: 134 MMHG

## 2020-07-28 VITALS — SYSTOLIC BLOOD PRESSURE: 129 MMHG | DIASTOLIC BLOOD PRESSURE: 71 MMHG

## 2020-07-28 VITALS — DIASTOLIC BLOOD PRESSURE: 59 MMHG | SYSTOLIC BLOOD PRESSURE: 118 MMHG

## 2020-07-28 VITALS — SYSTOLIC BLOOD PRESSURE: 104 MMHG | DIASTOLIC BLOOD PRESSURE: 45 MMHG

## 2020-07-28 VITALS — SYSTOLIC BLOOD PRESSURE: 115 MMHG | DIASTOLIC BLOOD PRESSURE: 55 MMHG

## 2020-07-28 VITALS — SYSTOLIC BLOOD PRESSURE: 121 MMHG | DIASTOLIC BLOOD PRESSURE: 57 MMHG

## 2020-07-28 VITALS — DIASTOLIC BLOOD PRESSURE: 48 MMHG | SYSTOLIC BLOOD PRESSURE: 96 MMHG

## 2020-07-28 VITALS — DIASTOLIC BLOOD PRESSURE: 65 MMHG | SYSTOLIC BLOOD PRESSURE: 113 MMHG

## 2020-07-28 VITALS — DIASTOLIC BLOOD PRESSURE: 51 MMHG | SYSTOLIC BLOOD PRESSURE: 118 MMHG

## 2020-07-28 VITALS — DIASTOLIC BLOOD PRESSURE: 61 MMHG | SYSTOLIC BLOOD PRESSURE: 118 MMHG

## 2020-07-28 VITALS — SYSTOLIC BLOOD PRESSURE: 90 MMHG | DIASTOLIC BLOOD PRESSURE: 43 MMHG

## 2020-07-28 VITALS — SYSTOLIC BLOOD PRESSURE: 93 MMHG | DIASTOLIC BLOOD PRESSURE: 40 MMHG

## 2020-07-28 VITALS — SYSTOLIC BLOOD PRESSURE: 86 MMHG | DIASTOLIC BLOOD PRESSURE: 39 MMHG

## 2020-07-28 VITALS — DIASTOLIC BLOOD PRESSURE: 59 MMHG | SYSTOLIC BLOOD PRESSURE: 121 MMHG

## 2020-07-28 VITALS — SYSTOLIC BLOOD PRESSURE: 93 MMHG | DIASTOLIC BLOOD PRESSURE: 42 MMHG

## 2020-07-28 VITALS — SYSTOLIC BLOOD PRESSURE: 157 MMHG | DIASTOLIC BLOOD PRESSURE: 68 MMHG

## 2020-07-28 VITALS — SYSTOLIC BLOOD PRESSURE: 111 MMHG | DIASTOLIC BLOOD PRESSURE: 56 MMHG

## 2020-07-28 VITALS — DIASTOLIC BLOOD PRESSURE: 37 MMHG | SYSTOLIC BLOOD PRESSURE: 94 MMHG

## 2020-07-28 VITALS — DIASTOLIC BLOOD PRESSURE: 49 MMHG | SYSTOLIC BLOOD PRESSURE: 119 MMHG

## 2020-07-28 VITALS — SYSTOLIC BLOOD PRESSURE: 120 MMHG | DIASTOLIC BLOOD PRESSURE: 51 MMHG

## 2020-07-28 VITALS — SYSTOLIC BLOOD PRESSURE: 109 MMHG | DIASTOLIC BLOOD PRESSURE: 55 MMHG

## 2020-07-28 VITALS — SYSTOLIC BLOOD PRESSURE: 120 MMHG | DIASTOLIC BLOOD PRESSURE: 65 MMHG

## 2020-07-28 VITALS — SYSTOLIC BLOOD PRESSURE: 122 MMHG | DIASTOLIC BLOOD PRESSURE: 53 MMHG

## 2020-07-28 VITALS — DIASTOLIC BLOOD PRESSURE: 18 MMHG | SYSTOLIC BLOOD PRESSURE: 118 MMHG

## 2020-07-28 VITALS — DIASTOLIC BLOOD PRESSURE: 48 MMHG | SYSTOLIC BLOOD PRESSURE: 121 MMHG

## 2020-07-28 VITALS — DIASTOLIC BLOOD PRESSURE: 68 MMHG | SYSTOLIC BLOOD PRESSURE: 101 MMHG

## 2020-07-28 LAB
ALBUMIN SERPL-MCNC: 3.3 G/DL (ref 3.4–5)
ALP SERPL-CCNC: 50 U/L (ref 30–120)
ALT SERPL-CCNC: 33 U/L (ref 10–68)
ANION GAP SERPL CALC-SCNC: 12.3 MMOL/L (ref 8–16)
BASOPHILS NFR BLD AUTO: 0.1 % (ref 0–2)
BILIRUB SERPL-MCNC: 0.35 MG/DL (ref 0.2–1.3)
BUN SERPL-MCNC: 21 MG/DL (ref 7–18)
CALCIUM SERPL-MCNC: 7.9 MG/DL (ref 8.5–10.1)
CHLORIDE SERPL-SCNC: 109 MMOL/L (ref 98–107)
CHOLEST/HDLC SERPL: 3.1 RATIO (ref 2.3–4.1)
CK MB SERPL-MCNC: 2.2 U/L (ref 0–3.6)
CK MB SERPL-MCNC: 2.6 U/L (ref 0–3.6)
CK SERPL-CCNC: 113 UL (ref 21–215)
CK SERPL-CCNC: 129 UL (ref 21–215)
CO2 SERPL-SCNC: 26.5 MMOL/L (ref 21–32)
CREAT SERPL-MCNC: 1.2 MG/DL (ref 0.6–1.3)
EOSINOPHIL NFR BLD: 0.5 % (ref 0–7)
ERYTHROCYTE [DISTWIDTH] IN BLOOD BY AUTOMATED COUNT: 14.9 % (ref 11.5–14.5)
GLOBULIN SER-MCNC: 3.3 G/L
GLUCOSE SERPL-MCNC: 106 MG/DL (ref 74–106)
HCT VFR BLD CALC: 37.5 % (ref 36–48)
HDLC SERPL-MCNC: 50 MG/DL (ref 32–96)
HGB BLD-MCNC: 11.9 G/DL (ref 12–16)
IMM GRANULOCYTES NFR BLD: 0.1 % (ref 0–5)
LDL-HDL RATIO: 1.7 RATIO (ref 1.5–3.5)
LDLC SERPL-MCNC: 87 MG/DL (ref 0–100)
LYMPHOCYTES NFR BLD AUTO: 24.1 % (ref 15–50)
MAGNESIUM SERPL-MCNC: 2.3 MG/DL (ref 1.8–2.4)
MCH RBC QN AUTO: 28.5 PG (ref 26–34)
MCHC RBC AUTO-ENTMCNC: 31.7 G/DL (ref 31–37)
MCV RBC: 89.9 FL (ref 80–100)
MONOCYTES NFR BLD: 7.5 % (ref 2–11)
NEUTROPHILS NFR BLD AUTO: 67.7 % (ref 40–80)
OSMOLALITY SERPL CALC.SUM OF ELEC: 289 MOSM/KG (ref 275–300)
PHOSPHATE SERPL-MCNC: 3.9 MG/DL (ref 2.5–4.9)
PLATELET # BLD: 210 10X3/UL (ref 130–400)
PMV BLD AUTO: 10.7 FL (ref 7.4–10.4)
POTASSIUM SERPL-SCNC: 3.8 MMOL/L (ref 3.5–5.1)
PROT SERPL-MCNC: 6.6 G/DL (ref 6.4–8.2)
RBC # BLD AUTO: 4.17 10X6/UL (ref 4–5.4)
SODIUM SERPL-SCNC: 144 MMOL/L (ref 136–145)
TRIGL SERPL-MCNC: 82 MG/DL (ref 30–200)
TROPONIN I SERPL-MCNC: 0.1 NG/ML (ref 0–0.06)
TROPONIN I SERPL-MCNC: 0.1 NG/ML (ref 0–0.06)
WBC # BLD AUTO: 8 10X3/UL (ref 4.8–10.8)

## 2020-07-28 PROCEDURE — B2151ZZ FLUOROSCOPY OF LEFT HEART USING LOW OSMOLAR CONTRAST: ICD-10-PCS | Performed by: INTERNAL MEDICINE

## 2020-07-28 PROCEDURE — B2111ZZ FLUOROSCOPY OF MULTIPLE CORONARY ARTERIES USING LOW OSMOLAR CONTRAST: ICD-10-PCS | Performed by: INTERNAL MEDICINE

## 2020-07-28 PROCEDURE — 4A023N7 MEASUREMENT OF CARDIAC SAMPLING AND PRESSURE, LEFT HEART, PERCUTANEOUS APPROACH: ICD-10-PCS | Performed by: INTERNAL MEDICINE

## 2020-07-28 NOTE — NUR
PT TRANSFERRED FROM CHAIR BACK TO BED. SOB, ON 15L 02 VIA HIGH FLOW NC.
COUGHING UP THINK TAN MUCUS. HR 100S. HOB ELEVATED 60 DEGREES. WILL CONTINUE
TO MONITOR.

## 2020-07-28 NOTE — NUR
PT C/O RIGHT SIDED SHOULDER/CHEST PAIN ONCE AGAIN, MEDICATION ADMINISTERED PER
MAR. WILL CONTINUE TO MONITOR.

## 2020-07-28 NOTE — NUR
SPOKE WITH MIKIE LIZARRAGA WITH CARDIOLOGY REGARING REDRAW OF MORNING LABS. NO
NEED TO RE-DRAW MORNING LABS AT THIS TIME PER CARDIOLOGY.

## 2020-07-28 NOTE — NUR
18FR KHAN CATHERTER INSERTED PER ORDER. STERILE TECHNIQUE USED. TOLERATED
WELL. PULLED UP AND REPOSITIONED FOR COMFORT. CLEAN PAD PROVIDED. NO FURTHER
NEEDS AT THIS TIME.

## 2020-07-28 NOTE — NUR
PT HAS SOB, O2 SAT DROPPING TO 90 ON 3L O2. , DR GONZALES SPOKEN WITH,
UPDATED ON STATUS, NEW ORDERS RECEIVED. PULMONOLOGY CONSULTED, AWAITING CALL
BACK. PT COUGHING UP THICK, TAN SPUTUM.

## 2020-07-28 NOTE — NUR
SPOKE WITH BASIA LOVETT'S DAUGHTER. PASSCODE VERIFIED. WANTS DR. NIELSEN TO CALL
HER AND GIVE UPDATE. HER PHONE NUMBER [phone].

## 2020-07-28 NOTE — NUR
PT ARRIVED FROM CATH LAB. ON 6L O2 VIA HIGH FLOW NC. RIGHT GROIN CATH
INSERTION SITE WITH DRESSING C/D/I. SOFT TO PALMPATION. NO TEMPERATURE. SPB
90S. WILL CONTINUE TO MONITOR.

## 2020-07-28 NOTE — NUR
CONSENT FORMS SIGNED FOR CATH PROCEDURE. BLOOD CONSENT FORM SIGNED AND PLACED
IN CHART. PT RESTING COMFORTABLY. 02 SAT 97% ON 8L O2 VIA HIGH FLOW NC.

## 2020-07-28 NOTE — NUR
CALL RECEIVED FROM [facility] [employer]. PASSCODE VERIFIED. BRIEF UPDATE GIVEN. HER
PHONE NUMBER [phone].

## 2020-07-28 NOTE — NUR
SPOKE WITH DR. HUANG. DOES NOT WANT LEVOPHED TO BE STARTED AT THIS TIME. BP
106/51 AT THIS TIME. WILL CONTINUE TO MONITOR.

## 2020-07-29 VITALS — DIASTOLIC BLOOD PRESSURE: 50 MMHG | SYSTOLIC BLOOD PRESSURE: 108 MMHG

## 2020-07-29 VITALS — DIASTOLIC BLOOD PRESSURE: 53 MMHG | SYSTOLIC BLOOD PRESSURE: 117 MMHG

## 2020-07-29 VITALS — SYSTOLIC BLOOD PRESSURE: 127 MMHG | DIASTOLIC BLOOD PRESSURE: 64 MMHG

## 2020-07-29 VITALS — DIASTOLIC BLOOD PRESSURE: 43 MMHG | SYSTOLIC BLOOD PRESSURE: 91 MMHG

## 2020-07-29 VITALS — SYSTOLIC BLOOD PRESSURE: 116 MMHG | DIASTOLIC BLOOD PRESSURE: 36 MMHG

## 2020-07-29 VITALS — SYSTOLIC BLOOD PRESSURE: 109 MMHG | DIASTOLIC BLOOD PRESSURE: 48 MMHG

## 2020-07-29 VITALS — SYSTOLIC BLOOD PRESSURE: 124 MMHG | DIASTOLIC BLOOD PRESSURE: 56 MMHG

## 2020-07-29 VITALS — DIASTOLIC BLOOD PRESSURE: 64 MMHG | SYSTOLIC BLOOD PRESSURE: 129 MMHG

## 2020-07-29 VITALS — DIASTOLIC BLOOD PRESSURE: 47 MMHG | SYSTOLIC BLOOD PRESSURE: 106 MMHG

## 2020-07-29 VITALS — SYSTOLIC BLOOD PRESSURE: 99 MMHG | DIASTOLIC BLOOD PRESSURE: 42 MMHG

## 2020-07-29 VITALS — SYSTOLIC BLOOD PRESSURE: 117 MMHG | DIASTOLIC BLOOD PRESSURE: 60 MMHG

## 2020-07-29 VITALS — DIASTOLIC BLOOD PRESSURE: 40 MMHG | SYSTOLIC BLOOD PRESSURE: 111 MMHG

## 2020-07-29 VITALS — DIASTOLIC BLOOD PRESSURE: 55 MMHG | SYSTOLIC BLOOD PRESSURE: 117 MMHG

## 2020-07-29 VITALS — DIASTOLIC BLOOD PRESSURE: 42 MMHG | SYSTOLIC BLOOD PRESSURE: 134 MMHG

## 2020-07-29 VITALS — DIASTOLIC BLOOD PRESSURE: 50 MMHG | SYSTOLIC BLOOD PRESSURE: 125 MMHG

## 2020-07-29 VITALS — DIASTOLIC BLOOD PRESSURE: 42 MMHG | SYSTOLIC BLOOD PRESSURE: 96 MMHG

## 2020-07-29 VITALS — SYSTOLIC BLOOD PRESSURE: 117 MMHG | DIASTOLIC BLOOD PRESSURE: 74 MMHG

## 2020-07-29 VITALS — SYSTOLIC BLOOD PRESSURE: 112 MMHG | DIASTOLIC BLOOD PRESSURE: 48 MMHG

## 2020-07-29 VITALS — DIASTOLIC BLOOD PRESSURE: 51 MMHG | SYSTOLIC BLOOD PRESSURE: 116 MMHG

## 2020-07-29 VITALS — SYSTOLIC BLOOD PRESSURE: 112 MMHG | DIASTOLIC BLOOD PRESSURE: 42 MMHG

## 2020-07-29 VITALS — DIASTOLIC BLOOD PRESSURE: 55 MMHG | SYSTOLIC BLOOD PRESSURE: 121 MMHG

## 2020-07-29 VITALS — DIASTOLIC BLOOD PRESSURE: 38 MMHG | SYSTOLIC BLOOD PRESSURE: 125 MMHG

## 2020-07-29 VITALS — SYSTOLIC BLOOD PRESSURE: 114 MMHG | DIASTOLIC BLOOD PRESSURE: 63 MMHG

## 2020-07-29 VITALS — DIASTOLIC BLOOD PRESSURE: 60 MMHG | SYSTOLIC BLOOD PRESSURE: 130 MMHG

## 2020-07-29 VITALS — SYSTOLIC BLOOD PRESSURE: 111 MMHG | DIASTOLIC BLOOD PRESSURE: 52 MMHG

## 2020-07-29 VITALS — DIASTOLIC BLOOD PRESSURE: 62 MMHG | SYSTOLIC BLOOD PRESSURE: 128 MMHG

## 2020-07-29 VITALS — SYSTOLIC BLOOD PRESSURE: 114 MMHG | DIASTOLIC BLOOD PRESSURE: 55 MMHG

## 2020-07-29 VITALS — DIASTOLIC BLOOD PRESSURE: 53 MMHG | SYSTOLIC BLOOD PRESSURE: 118 MMHG

## 2020-07-29 VITALS — DIASTOLIC BLOOD PRESSURE: 53 MMHG | SYSTOLIC BLOOD PRESSURE: 120 MMHG

## 2020-07-29 VITALS — SYSTOLIC BLOOD PRESSURE: 125 MMHG | DIASTOLIC BLOOD PRESSURE: 64 MMHG

## 2020-07-29 VITALS — DIASTOLIC BLOOD PRESSURE: 57 MMHG | SYSTOLIC BLOOD PRESSURE: 119 MMHG

## 2020-07-29 VITALS — SYSTOLIC BLOOD PRESSURE: 113 MMHG | DIASTOLIC BLOOD PRESSURE: 51 MMHG

## 2020-07-29 VITALS — SYSTOLIC BLOOD PRESSURE: 117 MMHG | DIASTOLIC BLOOD PRESSURE: 54 MMHG

## 2020-07-29 VITALS — DIASTOLIC BLOOD PRESSURE: 36 MMHG | SYSTOLIC BLOOD PRESSURE: 117 MMHG

## 2020-07-29 VITALS — SYSTOLIC BLOOD PRESSURE: 101 MMHG | DIASTOLIC BLOOD PRESSURE: 38 MMHG

## 2020-07-29 VITALS — DIASTOLIC BLOOD PRESSURE: 42 MMHG | SYSTOLIC BLOOD PRESSURE: 106 MMHG

## 2020-07-29 VITALS — DIASTOLIC BLOOD PRESSURE: 41 MMHG | SYSTOLIC BLOOD PRESSURE: 102 MMHG

## 2020-07-29 VITALS — SYSTOLIC BLOOD PRESSURE: 128 MMHG | DIASTOLIC BLOOD PRESSURE: 53 MMHG

## 2020-07-29 VITALS — SYSTOLIC BLOOD PRESSURE: 108 MMHG | DIASTOLIC BLOOD PRESSURE: 24 MMHG

## 2020-07-29 VITALS — DIASTOLIC BLOOD PRESSURE: 59 MMHG | SYSTOLIC BLOOD PRESSURE: 122 MMHG

## 2020-07-29 VITALS — DIASTOLIC BLOOD PRESSURE: 60 MMHG | SYSTOLIC BLOOD PRESSURE: 128 MMHG

## 2020-07-29 VITALS — SYSTOLIC BLOOD PRESSURE: 118 MMHG | DIASTOLIC BLOOD PRESSURE: 49 MMHG

## 2020-07-29 VITALS — DIASTOLIC BLOOD PRESSURE: 49 MMHG | SYSTOLIC BLOOD PRESSURE: 132 MMHG

## 2020-07-29 VITALS — DIASTOLIC BLOOD PRESSURE: 67 MMHG | SYSTOLIC BLOOD PRESSURE: 109 MMHG

## 2020-07-29 VITALS — SYSTOLIC BLOOD PRESSURE: 114 MMHG | DIASTOLIC BLOOD PRESSURE: 58 MMHG

## 2020-07-29 VITALS — DIASTOLIC BLOOD PRESSURE: 62 MMHG | SYSTOLIC BLOOD PRESSURE: 119 MMHG

## 2020-07-29 VITALS — SYSTOLIC BLOOD PRESSURE: 128 MMHG | DIASTOLIC BLOOD PRESSURE: 54 MMHG

## 2020-07-29 VITALS — DIASTOLIC BLOOD PRESSURE: 43 MMHG | SYSTOLIC BLOOD PRESSURE: 104 MMHG

## 2020-07-29 VITALS — SYSTOLIC BLOOD PRESSURE: 117 MMHG | DIASTOLIC BLOOD PRESSURE: 46 MMHG

## 2020-07-29 VITALS — DIASTOLIC BLOOD PRESSURE: 42 MMHG | SYSTOLIC BLOOD PRESSURE: 115 MMHG

## 2020-07-29 VITALS — SYSTOLIC BLOOD PRESSURE: 132 MMHG | DIASTOLIC BLOOD PRESSURE: 43 MMHG

## 2020-07-29 VITALS — DIASTOLIC BLOOD PRESSURE: 35 MMHG | SYSTOLIC BLOOD PRESSURE: 106 MMHG

## 2020-07-29 VITALS — DIASTOLIC BLOOD PRESSURE: 47 MMHG | SYSTOLIC BLOOD PRESSURE: 110 MMHG

## 2020-07-29 VITALS — DIASTOLIC BLOOD PRESSURE: 56 MMHG | SYSTOLIC BLOOD PRESSURE: 152 MMHG

## 2020-07-29 VITALS — DIASTOLIC BLOOD PRESSURE: 63 MMHG | SYSTOLIC BLOOD PRESSURE: 125 MMHG

## 2020-07-29 LAB
ANION GAP SERPL CALC-SCNC: 9.3 MMOL/L (ref 8–16)
BASOPHILS NFR BLD AUTO: 0 % (ref 0–2)
BUN SERPL-MCNC: 18 MG/DL (ref 7–18)
CALCIUM SERPL-MCNC: 7.7 MG/DL (ref 8.5–10.1)
CHLORIDE SERPL-SCNC: 109 MMOL/L (ref 98–107)
CO2 SERPL-SCNC: 31.5 MMOL/L (ref 21–32)
CREAT SERPL-MCNC: 1 MG/DL (ref 0.6–1.3)
EOSINOPHIL NFR BLD: 0.6 % (ref 0–7)
ERYTHROCYTE [DISTWIDTH] IN BLOOD BY AUTOMATED COUNT: 15 % (ref 11.5–14.5)
GLUCOSE SERPL-MCNC: 94 MG/DL (ref 74–106)
HCT VFR BLD CALC: 36 % (ref 36–48)
HGB BLD-MCNC: 11.1 G/DL (ref 12–16)
IMM GRANULOCYTES NFR BLD: 0.2 % (ref 0–5)
LYMPHOCYTES NFR BLD AUTO: 21.1 % (ref 15–50)
MAGNESIUM SERPL-MCNC: 2.2 MG/DL (ref 1.8–2.4)
MCH RBC QN AUTO: 28.2 PG (ref 26–34)
MCHC RBC AUTO-ENTMCNC: 30.8 G/DL (ref 31–37)
MCV RBC: 91.6 FL (ref 80–100)
MONOCYTES NFR BLD: 7.9 % (ref 2–11)
NEUTROPHILS NFR BLD AUTO: 70.2 % (ref 40–80)
OSMOLALITY SERPL CALC.SUM OF ELEC: 292 MOSM/KG (ref 275–300)
PHOSPHATE SERPL-MCNC: 3.4 MG/DL (ref 2.5–4.9)
PLATELET # BLD: 162 10X3/UL (ref 130–400)
PMV BLD AUTO: 10.8 FL (ref 7.4–10.4)
POTASSIUM SERPL-SCNC: 3.8 MMOL/L (ref 3.5–5.1)
RBC # BLD AUTO: 3.93 10X6/UL (ref 4–5.4)
SODIUM SERPL-SCNC: 146 MMOL/L (ref 136–145)
WBC # BLD AUTO: 6.3 10X3/UL (ref 4.8–10.8)

## 2020-07-29 NOTE — NUR
ASSISTED BACK TO BED. C/O NAUSEA AND HUNGER. "I CAN'T EAT. CALL MY DAUGHTERS
TO COME GET ME". REINFORCEMENT OF HEALTH CONDITION AND NEED FOR MEDICATIONS.

## 2020-07-29 NOTE — NUR
ASSESSMENT COMPLETED PER FLOWSHEET. PT AWAKE AND ALERT, DENIES ANY DISCOMFORT
AT THIS TIME. NSR IN CM WITH HR AT 65BPM. RT GROIN DRESSING C,D,I, NO S/S OF
HEMATOMA OR BLEEDING NOTED. PPP. HOB UP. SIDE RAILS UP. CALL LIGHT AND BST IN
REACH. CONT TO MONITOR.

## 2020-07-29 NOTE — NUR
ASSISTED UP TO CHAIR FOR LUNCH. WANTS NURSE TO CALL DAUGHTERS TO COME GET HER
AND TAKE HER HOME. EXPLAINED NEED FOR MEDICATION TO STRENGTHEN HEART. TEARFUL.

## 2020-07-29 NOTE — NUR
TYLENOL 650MG GIVEN FOR PREVIOUS C/O PAIN AND GENERALIZED DISCOMFORT. ASSISTED
BACK TO BED. RIGHT GROIN ASSESSED, NO SWELLING OR REDNESS PRESENT.

## 2020-07-29 NOTE — NUR
REPORT RECEIVED AND ASSESSMENT PERFORMED. PATIENT IS UP IN BEDSIDE CHAIR,
AWAKE AND ALERT. VERY TEARFUL. VOICE TREMULOUS. HEAD AND JAW SHAKING.
C/O PAIN TO RIGHT AC PIV SITE AND RIGHT GROIN CATH SITE. PIV SITE
SLIGHTLY RED, NO SWELLING. KHAN CATHETER INTACT TO BSD. UABLE TO ACCESS GROIN
AT THIS TIME. PEDAL PULSE INTACT.

## 2020-07-29 NOTE — NUR
ONDANSETRON 4MG IVP GIVEN FOR CONTINUED NAUSEA. MORPHINE 2MG GIVEN FOR C/O
RIGHT GROIN PAIN 8/10 ON PAIN SCALE.

## 2020-07-29 NOTE — NUR
ASSISTED PT TO BATHROOM. MEDIUM FORMED STOOL RESULTED. LILIBETH CARE PROVIDED. BED
PAD CHANGED. REPOSITIONED SELF IN BED. PT CHRIS WELL. HOB UP. SIDE RAILS UP.
CALL LIGHT IN REACH. CPOC.

## 2020-07-29 NOTE — NUR
REASSESSMENT COMPLETED. SEE FLOWSHEETS FOR ALL FINDINGS. NO ACUTE CHANGES
IN PT'S STATUS NOTED. VSS. CPOC.

## 2020-07-29 NOTE — NUR
DAUGHTER AT BEDSIDE FOR VISIT. GROUP PHONE CALL WITH OTHER FAMILY MEMBERS. IN
BETTER SPIRITS. STATES PAIN LEVEL IS NOW A 2/10.

## 2020-07-29 NOTE — NUR
PT C/O SCD'S CAUSE PAIN TO HER ANKLES. SCD'S REMOVED PER PT'S REQUEST.
EXPLAINED THE IMPORTANCE TO USE SCD'S. PT TEARFUL. CPOC.

## 2020-07-30 VITALS — DIASTOLIC BLOOD PRESSURE: 75 MMHG | SYSTOLIC BLOOD PRESSURE: 111 MMHG

## 2020-07-30 VITALS — DIASTOLIC BLOOD PRESSURE: 53 MMHG | SYSTOLIC BLOOD PRESSURE: 122 MMHG

## 2020-07-30 VITALS — DIASTOLIC BLOOD PRESSURE: 60 MMHG | SYSTOLIC BLOOD PRESSURE: 123 MMHG

## 2020-07-30 VITALS — DIASTOLIC BLOOD PRESSURE: 85 MMHG | SYSTOLIC BLOOD PRESSURE: 135 MMHG

## 2020-07-30 VITALS — DIASTOLIC BLOOD PRESSURE: 56 MMHG | SYSTOLIC BLOOD PRESSURE: 131 MMHG

## 2020-07-30 VITALS — SYSTOLIC BLOOD PRESSURE: 130 MMHG | DIASTOLIC BLOOD PRESSURE: 77 MMHG

## 2020-07-30 LAB
ANION GAP SERPL CALC-SCNC: 11.7 MMOL/L (ref 8–16)
BASOPHILS NFR BLD AUTO: 0.2 % (ref 0–2)
BUN SERPL-MCNC: 12 MG/DL (ref 7–18)
CALCIUM SERPL-MCNC: 7.8 MG/DL (ref 8.5–10.1)
CHLORIDE SERPL-SCNC: 110 MMOL/L (ref 98–107)
CO2 SERPL-SCNC: 26.6 MMOL/L (ref 21–32)
CREAT SERPL-MCNC: 0.8 MG/DL (ref 0.6–1.3)
EOSINOPHIL NFR BLD: 1.4 % (ref 0–7)
ERYTHROCYTE [DISTWIDTH] IN BLOOD BY AUTOMATED COUNT: 14.9 % (ref 11.5–14.5)
GLUCOSE SERPL-MCNC: 100 MG/DL (ref 74–106)
HCT VFR BLD CALC: 34.5 % (ref 36–48)
HGB BLD-MCNC: 10.8 G/DL (ref 12–16)
IMM GRANULOCYTES NFR BLD: 0.2 % (ref 0–5)
LYMPHOCYTES NFR BLD AUTO: 22 % (ref 15–50)
MAGNESIUM SERPL-MCNC: 2.1 MG/DL (ref 1.8–2.4)
MCH RBC QN AUTO: 28.4 PG (ref 26–34)
MCHC RBC AUTO-ENTMCNC: 31.3 G/DL (ref 31–37)
MCV RBC: 90.8 FL (ref 80–100)
MONOCYTES NFR BLD: 7.3 % (ref 2–11)
NEUTROPHILS NFR BLD AUTO: 68.9 % (ref 40–80)
OSMOLALITY SERPL CALC.SUM OF ELEC: 286 MOSM/KG (ref 275–300)
PHOSPHATE SERPL-MCNC: 2.7 MG/DL (ref 2.5–4.9)
PLATELET # BLD: 177 10X3/UL (ref 130–400)
PMV BLD AUTO: 11.2 FL (ref 7.4–10.4)
POTASSIUM SERPL-SCNC: 4.3 MMOL/L (ref 3.5–5.1)
RBC # BLD AUTO: 3.8 10X6/UL (ref 4–5.4)
SODIUM SERPL-SCNC: 144 MMOL/L (ref 136–145)
WBC # BLD AUTO: 5.6 10X3/UL (ref 4.8–10.8)

## 2020-07-30 NOTE — OP
PATIENT NAME:  CARI BLAKE                                MEDICAL RECORD: [id number]
:42                                             LOCATION:[facility]    [id number]
                                                         ADMISSION DATE:20
SURGEON:  LISBET PALOMINO MD          
 
 
DATE OF OPERATION:  2020
 
PROCEDURE:  Left heart catheterization, selective coronary angiography, a right
femoral artery approach.
 
CATHETERS:  A 5-Slovak sheath, 5/4 left and right Curt, 5/4 pig.
 
The procedure was well tolerated.  The patient returned to the serrano.  Sheath
removed.  ExoSeal device placed.
 
FINDINGS:  Left ventriculography in 30-degree LINK view shows mid anterior, down
to the anterior apex, apical region, and inferior apex marked hypokinesis, EF
reduced 20%.
 
CORONARY ANATOMY:
LEFT MAIN:  Left main is free of disease.
LAD:  Free of disease in the diagonal system.
CIRCUMFLEX:  Free of disease in the marginal system.
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
 
IMPRESSION:  Nonischemic cardiomyopathy.
 
TRANSINT:[id number] Voice Confirmation ID: [id number] DOCUMENT ID: [id number]
                                           
                                           LISBET PALOMINO MD          
 
 
 
Electronically Signed by LISBET PALOMINO on 20 at 1526
 
 
 
 
 
 
 
 
 
 
 
 
 
 
 
CC:                                                             [id number]
DICTATION DATE: 20 1501     :     20      ADM IN  
                                                                              
[facility]                                          
[address] [place] [address]

## 2020-07-30 NOTE — NUR
PT SITTING UP IN CHAIR AWAKE AT THIS TIME. VSS. NO ACUTE DISTRESS NOTED. PT
DENIES ANY NEEDS OR DISCOMFORTS. STATES SHE IS READY TO GO HOME TODAY. WILL
CONTINUE PLAN OF CARE.

## 2020-07-30 NOTE — NUR
WRITTEN AND VERBAL DISCHARGE INSTRUCTIONS GIVEN  DAUGHTER VERBALZED
UNDERSTANDING FOLLOW UP APPT MADE WITH DR PALOMINO  ESCORTED TO CAR VIA
WHEELCHAIR

## 2020-07-30 NOTE — NUR
ASSISSTED TO BATHROOM. PT SMALL BM NOTED. BACK TO CHAIR. PT C/O FILLING WEAK.
CALL LIGHT IN REACH. BST IN REACH. WILL CONT TO MONITOR.

## 2020-07-30 NOTE — MORECARE
CASE MANAGEMENT DISCHARGE SUMMARY
 
 
PATIENT: CARI BLAKE                          UNIT: [id number]
ACCOUNT#: [id number]                       ADM DATE: 20
AGE: 80     : 42  SEX: F            ROOM/BED: D.06    
AUTHOR: DENNY GARZA                             PHYSICIAN:                               
 
REFERRING PHYSICIAN: CARLITO GONZALES MD                
DATE OF SERVICE: 20
Discharge Plan
 
 
Patient Name: CARI BLAKE
Facility: [facility]:[place]
Encounter #: [id number]
Medical Record #: [id number]
: 1940
Planned Disposition: Home
Anticipated Discharge Date: 20
 
Discharge Date: 
Expected LOS: 3
Initial Reviewer: [id number]
Initial Review Date: 2020
Generated: 20   4:58 pm 
Comments
 
DCP- Discharge Planning
 
Updated by [id number]: Tiffany Agustin on 20   2:53 pm CT
Patient Name: CARI BLAKE                                     
Admission Status: ER   
Accout number: [id number]                              
Admission Date: 2020   
: 1940                                                        
Admission Diagnosis:HEART FAILURE, UNSPECIFIED   
Attending: CARLITO OGNZALES                                                
Current LOS:  3   
  
Anticipated DC Date: 2020   
Planned Disposition: Home   
Primary Insurance: MEDICARE A & B   
  
  
Discharge Planning Comments: CM met with patient to discuss discharge 
planning / needs. Patient states she plans to discharge to her daughter's 
house in [place] because the patient live alone. Daughter, Kaylan Reed, has 
gone to [employer] to get the patient some clothes for discharge. Patient 
states the home environment is safe. Denies any discharge needs at this time. 
 
States daughter will drive her home upon DC. States she gets home health from 
[facility] and personal care aide, but doesn't remember name of agency.
 States she has a walker. Denied any other community resource needs at this 
time. CM explained DC IMM. Signed copy on chart. CM will continue to follow 
and assist as needed with discharge planning needs.   
  
  
  
  
  
  
: Tiffany Agustin
 DCPIA - Discharge Planning Initial Assessment
 
Updated by [id number]: Tiffany Agustin on 20   3:49 pm
*  Is the patient Alert and Oriented?
Yes
*  How many steps to enter\exit or inside your home?
 
*  PCP
Dr. Branden Lock
*  Pharmacy
[facility]:  [phone]
*  Preadmission Environment
Home Alone
*  ADLs
Independent
*  Equipment
Walker
*  List name and contact numbers for known caregivers / representatives who 
currently or will assist patient after discharge:
Kaylan Reed, daughter, [phone]
*  Verbal permission to speak to the caregivers and representatives has been 
obtained from the patient.
Yes
*  Community resources currently utilized
Home Health
*  Please name any agencies selected above.
[facility]
*  Additional services required to return to the preadmission environment?
No
*  Can the patient safely return to the preadmission environment?
Yes
*  Has this patient been hospitalized within the prior 30 days at any 
hospital?
No
 
 
 
 
 
Coverage Notice
 
 
Reviewer: [email or web address] Regine Agustin
 
Notice Issued Date-Time: 2020  15:05
Notice Type: IM Discharge Notice
 
Notice Delivered To: Patient
Relationship to Patient: Self
Representative Name: 
 
Delivery Method: HAND - Hand Delivered
Serina Days:
Prior Verbal Notification: 
 
Recipient Understood Notice: Yes
Recipient Signature: Yes
Med Rec Note Co-signed by Attending:
 
Coverage Notice Comment:  
 
Last DP export: 20   2:50 p
Patient Name: CARI BLAKE
Encounter #: [id number]
Page [id number]
 
 
 
 
 
Electronically Signed by DENNY GARZA on 20 at 1559
 
 
 
 
 
 
**All edits/amendments must be made on the electronic document**
 
DICTATION DATE: 20     : ROBBIE  20     
RPT#: [id number]                                DC DATE:        
                                               STATUS: ADM IN  
[facility]
 [place] [address]
***END OF REPORT***

## 2020-07-30 NOTE — NUR
PT TEARFUL STATING SHE WANTS TO BE OUT OF THE CHAIR AND BACK IN THE BED. NURSE
ASKED PT IF SHE COULD STAY IN THE CHAIR UNTIL AFTER DR GONZALES, WHO WAS IN THE
UNIT, GOT TO SEE HER AND THEN GO BACK TO BED.
PT STATED, NO I JUST WANT TO GO HOME. DR GONZALES CAME INTO ROOM TO SEE PT AND
TOLD PT THAT IF OKAY WITH PULMONOLOGY THEN SHE CAN GO HOME TODAY SINCE ALREADY
OKAYED BY CARDIOLOGY, PT THEN STATED, "I DON'T CARE, EITHER WAY I AM GOING
HOME TODAY." ALSO RECIEVED ORDERS FROM PHYSICIAN TO DC KHAN AND SALINE LOCK
IV. KHAN DCD, CATHETER TIP INTACT. VSS. NO ACUTE DISTRESS NOTED. WILL
CONTINUE PLAN OF CARE.

## 2020-07-30 NOTE — MORECARE
CASE MANAGEMENT DISCHARGE SUMMARY
 
 
PATIENT: CARI BLAKE                          UNIT: [id number]
ACCOUNT#: [id number]                       ADM DATE: 20
AGE: 80     : 42  SEX: F            ROOM/BED: [facility]    
AUTHOR: DENNY GARZA                             PHYSICIAN:                               
 
REFERRING PHYSICIAN: CARLITO GONZALES MD                
DATE OF SERVICE: 20
Discharge Plan
 
 
Patient Name: CARI BLAKE
[facility]
Encounter #: [id number]
Medical Record #: [id number]
: 1940
Planned Disposition: Home
Anticipated Discharge Date: 20
 
Discharge Date: 
Expected LOS: 3
Initial Reviewer: [id number]
Initial Review Date: 2020
Generated: 20   4:50 pm 
 DCPIA - Discharge Planning Initial Assessment
 
Updated by [id number]: Tiffany Agustin on 20   3:49 pm
*  Is the patient Alert and Oriented?
Yes
*  How many steps to enter\exit or inside your home?
 
*  PCP
Dr. Branden Lock
*  Pharmacy
[facility] [place]:  [phone]
*  Preadmission Environment
Home Alone
*  ADLs
Independent
*  Equipment
Walker
*  List name and contact numbers for known caregivers / representatives who 
currently or will assist patient after discharge:
Kaylan Reed, daughter, [phone]
*  Verbal permission to speak to the caregivers and representatives has been 
obtained from the patient.
Yes
*  Community resources currently utilized
 
Home Health
*  Please name any agencies selected above.
[facility]
*  Additional services required to return to the preadmission environment?
No
*  Can the patient safely return to the preadmission environment?
Yes
*  Has this patient been hospitalized within the prior 30 days at any 
hospital?
No
 
 
 
 
 
 
Patient Name: CARI BLAKE
Encounter #: [id number]
Page [id number]
 
 
 
 
 
Electronically Signed by DENNY GARZA on 20 at 1550
 
 
 
 
 
 
**All edits/amendments must be made on the electronic document**
 
DICTATION DATE: 20 1550     : ROBBIE  20 1550     
RPT#: [id number]                                DC DATE:        
                                               STATUS: ADM IN  
[facility]
[address] [place] [address]
***END OF REPORT***

## 2020-07-31 NOTE — MORECARE
CASE MANAGEMENT DISCHARGE SUMMARY
 
 
PATIENT: CARI BLAKE                          UNIT: [id number]
ACCOUNT#: [id number]                       ADM DATE: 20
AGE: 80     : 42  SEX: F            ROOM/BED: D.06    
AUTHOR: GREGDOC                             PHYSICIAN:                               
 
REFERRING PHYSICIAN: CARLITO GONZALES MD                
DATE OF SERVICE: 20
Discharge Plan
 
 
Patient Name: CARI BLAKE
Facility: [facility]:[place]
Encounter #: [id number]
Medical Record #: [id number]
: 1940
Planned Disposition: Home
Anticipated Discharge Date: 20
 
Discharge Date: 2020
Expected LOS: 3
Initial Reviewer: [id number]
Initial Review Date: 2020
Generated: 20   1:22 pm 
Comments
 
DCP- Discharge Planning
 
Updated by [id number]: Tiffany Agustin on 20   2:53 pm CT
Patient Name: CARI BLAKE                                     
Admission Status: ER   
Accout number: [id number]                              
Admission Date: 2020   
: 1940                                                        
Admission Diagnosis:HEART FAILURE, UNSPECIFIED   
Attending: CARLITO GONZALES                                                
Current LOS:  3   
  
Anticipated DC Date: 2020   
Planned Disposition: Home   
Primary Insurance: MEDICARE A & B   
  
  
Discharge Planning Comments: CM met with patient to discuss discharge 
planning / needs. Patient states she plans to discharge to her daughter's 
house in [place] because the patient live alone. Daughter, Kaylan Reed, has 
gone to [employer] to get the patient some clothes for discharge. Patient 
states the home environment is safe. Denies any discharge needs at this time. 
 
States daughter will drive her home upon DC. States she gets home health from 
[facility] and personal care aide, but doesn't remember name of agency.
 States she has a walker. Denied any other community resource needs at this 
time. CM explained DC IMM. Signed copy on chart. CM will continue to follow 
and assist as needed with discharge planning needs.   
  
  
  
  
  
  
: Tiffany Agustin
 DCPIA - Discharge Planning Initial Assessment
 
Updated by [email or web address]: Tiffany Agustin on 20   3:49 pm
*  Is the patient Alert and Oriented?
Yes
*  How many steps to enter\exit or inside your home?
 
*  PCP
Dr. Branden Lock
*  Pharmacy
[facility]:  [phone]
*  Preadmission Environment
Home Alone
*  ADLs
Independent
*  Equipment
Walker
*  List name and contact numbers for known caregivers / representatives who 
currently or will assist patient after discharge:
Kaylan Reed, daughter, [phone]
*  Verbal permission to speak to the caregivers and representatives has been 
obtained from the patient.
Yes
*  Community resources currently utilized
Home Health
*  Please name any agencies selected above.
[facility]
*  Additional services required to return to the preadmission environment?
No
*  Can the patient safely return to the preadmission environment?
Yes
*  Has this patient been hospitalized within the prior 30 days at any 
hospital?
No
 
 
 
 
 
Coverage Notice
 
 
Reviewer: [email or web address] Regine Agustin
 
Notice Issued Date-Time: 2020  15:05
Notice Type: IM Discharge Notice
 
Notice Delivered To: Patient
Relationship to Patient: Self
Representative Name: 
 
Delivery Method: HAND - Hand Delivered
Serina Days:
Prior Verbal Notification: 
 
Recipient Understood Notice: Yes
Recipient Signature: Yes
Med Rec Note Co-signed by Attending:
 
Coverage Notice Comment:  
 
Last DP export: 20   2:59 p
Patient Name: CARI BLAKE
Encounter #: [id number]
Page [id number]
 
 
 
 
 
Electronically Signed by DENNY GARZA on 20 at 1223
 
 
 
 
 
 
**All edits/amendments must be made on the electronic document**
 
DICTATION DATE: 20 122     : ROBBIE  20 1222     
RPT#: [id number]                                DC DATE:20
                                               STATUS: DIS IN  
[facility]
[address] [place] [address]
***END OF REPORT***

## 2022-07-29 NOTE — NUR
PT C/O PAIN IN RIGHT SHOULDER, NOT RADIATING. REPOSITIONED FOR COMFORT. Consent 3/Introductory Paragraph: I gave the patient a chance to ask questions they had about the procedure.  Following this I explained the Mohs procedure and consent was obtained. The risks, benefits and alternatives to therapy were discussed in detail. Specifically, the risks of infection, scarring, bleeding, prolonged wound healing, incomplete removal, allergy to anesthesia, nerve injury and recurrence were addressed. Prior to the procedure, the treatment site was clearly identified and confirmed by the patient. All components of Universal Protocol/PAUSE Rule completed.